# Patient Record
Sex: MALE | Race: WHITE | URBAN - METROPOLITAN AREA
[De-identification: names, ages, dates, MRNs, and addresses within clinical notes are randomized per-mention and may not be internally consistent; named-entity substitution may affect disease eponyms.]

---

## 2022-04-21 ENCOUNTER — OFFICE VISIT (OUTPATIENT)
Dept: FAMILY MEDICINE CLINIC | Age: 27
End: 2022-04-21
Payer: COMMERCIAL

## 2022-04-21 VITALS
DIASTOLIC BLOOD PRESSURE: 80 MMHG | HEIGHT: 66 IN | WEIGHT: 167 LBS | BODY MASS INDEX: 26.84 KG/M2 | SYSTOLIC BLOOD PRESSURE: 124 MMHG | TEMPERATURE: 97.2 F | HEART RATE: 69 BPM | OXYGEN SATURATION: 98 %

## 2022-04-21 DIAGNOSIS — R21 RASH AND NONSPECIFIC SKIN ERUPTION: Primary | ICD-10-CM

## 2022-04-21 DIAGNOSIS — Z78.9 MALE-TO-FEMALE TRANSGENDER PERSON: ICD-10-CM

## 2022-04-21 PROCEDURE — 99202 OFFICE O/P NEW SF 15 MIN: CPT | Performed by: FAMILY MEDICINE

## 2022-04-21 RX ORDER — SPIRONOLACTONE 100 MG/1
TABLET, FILM COATED ORAL
COMMUNITY
Start: 2022-03-17

## 2022-04-21 RX ORDER — ESTRADIOL 2 MG/1
TABLET ORAL
COMMUNITY
Start: 2022-03-17

## 2022-04-21 SDOH — ECONOMIC STABILITY: FOOD INSECURITY: WITHIN THE PAST 12 MONTHS, YOU WORRIED THAT YOUR FOOD WOULD RUN OUT BEFORE YOU GOT MONEY TO BUY MORE.: NEVER TRUE

## 2022-04-21 SDOH — ECONOMIC STABILITY: FOOD INSECURITY: WITHIN THE PAST 12 MONTHS, THE FOOD YOU BOUGHT JUST DIDN'T LAST AND YOU DIDN'T HAVE MONEY TO GET MORE.: NEVER TRUE

## 2022-04-21 ASSESSMENT — ENCOUNTER SYMPTOMS
RHINORRHEA: 0
COUGH: 0
ABDOMINAL PAIN: 0
SHORTNESS OF BREATH: 0
DIARRHEA: 0
CONSTIPATION: 0
SORE THROAT: 0
WHEEZING: 0

## 2022-04-21 ASSESSMENT — PATIENT HEALTH QUESTIONNAIRE - PHQ9
2. FEELING DOWN, DEPRESSED OR HOPELESS: 0
SUM OF ALL RESPONSES TO PHQ QUESTIONS 1-9: 0
1. LITTLE INTEREST OR PLEASURE IN DOING THINGS: 0
SUM OF ALL RESPONSES TO PHQ9 QUESTIONS 1 & 2: 0

## 2022-04-21 ASSESSMENT — SOCIAL DETERMINANTS OF HEALTH (SDOH): HOW HARD IS IT FOR YOU TO PAY FOR THE VERY BASICS LIKE FOOD, HOUSING, MEDICAL CARE, AND HEATING?: NOT HARD AT ALL

## 2022-04-21 NOTE — LETTER
[facility] Primary [facility]  [address] [address]  Phone: [phone]  Fax: [phone]    Delilah Hurst MD        April 21, 2022     Patient: Desean Espinoza   YOB: 1995   Date of Visit: 4/21/2022       To Whom It May Concern: Desean Espinoza is medically cleared to return to work without restrictions. If you have any questions or concerns, please don't hesitate to call.     Sincerely,        Dleilah Hurst MD

## 2022-04-21 NOTE — PROGRESS NOTES
[facility] [address] [place] [address] PRIMARY CARE  [address] [place] [id number]  Dept: [phone]  Dept Fax: [phone]: [phone]     Chief Complaint  Chief Complaint   Patient presents with   [facility] Establish Care     needs clearance to return to work,     Rash     was checked for bed bugs and none were found, rash is on right arm and B/L legs. HPI:  23 y.o.male who presents for the following:      Skin problem: 2-3 week ago found a single bed bug in his room; says he needs clearance to return to work; never found another bug; thinks this may have been the meds or detergents; the bumps can be itchy. Had an  come and didn't see any bugs. Works at a [employer] Review of Systems   Constitutional: Negative for chills and fever. HENT: Negative for congestion, rhinorrhea and sore throat. Respiratory: Negative for cough, shortness of breath and wheezing. Gastrointestinal: Negative for abdominal pain, constipation and diarrhea. Endocrine: Negative for polydipsia and polyuria. Genitourinary: Negative for dysuria, frequency and urgency. Skin: Positive for rash. Neurological: Negative for syncope, light-headedness, numbness and headaches. Psychiatric/Behavioral: Negative for sleep disturbance. The patient is not nervous/anxious. History reviewed. No pertinent past medical history.   Past Surgical History:   Procedure Laterality Date    TONSILLECTOMY       Social History     Socioeconomic History    Marital status: Single     Spouse name: Not on file    Number of children: Not on file    Years of education: Not on file    Highest education level: Not on file   Occupational History    Not on file   Tobacco Use    Smoking status: Never Smoker    Smokeless tobacco: Never Used   Substance and Sexual Activity    Alcohol use: Never    Drug use: Never    Sexual activity: Not on file Other Topics Concern    Not on file   Social History Narrative    Not on file     Social Determinants of Health     Financial Resource Strain: Low Risk     Difficulty of Paying Living Expenses: Not hard at all   Food Insecurity: No Food Insecurity    Worried About Running Out of Food in the Last Year: Never true    [address] in the Last Year: Never true   Transportation Needs:     Lack of Transportation (Medical): Not on file    Lack of Transportation (Non-Medical): Not on file   Physical Activity:     Days of Exercise per Week: Not on file    Minutes of Exercise per Session: Not on file   Stress:     Feeling of Stress : Not on file   Social Connections:     Frequency of Communication with Friends and Family: Not on file    Frequency of Social Gatherings with Friends and Family: Not on file    Attends Buddhist Services: Not on file    Active Member of [address] [place] [address] [employer] or Organizations: Not on file    Attends Club or Organization Meetings: Not on file    Marital Status: Not on file   Intimate Partner Violence:     Fear of Current or Ex-Partner: Not on file    Emotionally Abused: Not on file    Physically Abused: Not on file    Sexually Abused: Not on file   Housing Stability:     Unable to Pay for Housing in the Last Year: Not on file    Number of [place] in the Last Year: Not on file    Unstable Housing in the Last Year: Not on file     History reviewed. No pertinent family history. No Known Allergies  Current Outpatient Medications   Medication Sig Dispense Refill    estradiol (ESTRACE) 2 MG tablet TAKE TWO TABLETS BY MOUTH TWO TIMES A DAY      spironolactone (ALDACTONE) 100 MG tablet TAKE ONE TABLET BY MOUTH DAILY       No current facility-administered medications for this visit.          Vitals:    04/21/22 1345   BP: 124/80   Site: Right Upper Arm   Position: Sitting   Cuff Size: Medium Adult   Pulse: 69   Temp: 97.2 °F (36.2 °C)   TempSrc: Infrared   SpO2: 98%   Weight: 167 lb (75.8 kg)   Height: 5' 6\" (1.676 m)       Physical exam:  Physical Exam  Vitals reviewed. Constitutional:       General: He is not in acute distress. Appearance: He is well-developed. HENT:      Head: Normocephalic and atraumatic. Cardiovascular:      Rate and Rhythm: Normal rate. Pulmonary:      Effort: Pulmonary effort is normal. No respiratory distress. Musculoskeletal:      Cervical back: Normal range of motion. Skin:     General: Skin is warm and dry. Comments: Sparse scattered erythematous macules with excoriations over arms and legs   Neurological:      Mental Status: He is alert and oriented to person, place, and time. Psychiatric:         Behavior: Behavior normal.         Assessment/Plan:  23 y.o. male here mainly for rash:  - nonspecific rash. We talked about bed bugs; there's no med to provide. Already cleaned the home and had  visit. She can return to work  - Currently going to [facility] in [place] and transitioning Male to Female; taking estrodiol and spironolactone     Diagnosis Orders   1. Rash and nonspecific skin eruption     2. Male-to-female transgender person          Return if symptoms worsen or fail to improve.     Augustine Willoughby MD

## 2022-04-28 ENCOUNTER — TELEPHONE (OUTPATIENT)
Dept: FAMILY MEDICINE CLINIC | Age: 27
End: 2022-04-28

## 2022-04-28 NOTE — TELEPHONE ENCOUNTER
Spoke with the patient and he stated the FMLA is needed because he was out of work from 4/11-4/25/2022 due to bed bugs. He is going back to work on 4/28/22 at 4 pm.    If pt does not answer the phone it is ok to LM on his VM.

## 2022-04-28 NOTE — TELEPHONE ENCOUNTER
Patient dropped of FMLA forms. Are these okay to fill out? I don't see anything in the prior visit notes.

## 2024-10-19 ENCOUNTER — APPOINTMENT (OUTPATIENT)
Dept: RADIOLOGY | Facility: HOSPITAL | Age: 29
End: 2024-10-19

## 2024-10-19 ENCOUNTER — HOSPITAL ENCOUNTER (EMERGENCY)
Facility: HOSPITAL | Age: 29
Discharge: HOME | End: 2024-10-19
Attending: EMERGENCY MEDICINE

## 2024-10-19 ENCOUNTER — APPOINTMENT (OUTPATIENT)
Dept: CARDIOLOGY | Facility: HOSPITAL | Age: 29
End: 2024-10-19

## 2024-10-19 VITALS
SYSTOLIC BLOOD PRESSURE: 136 MMHG | RESPIRATION RATE: 18 BRPM | DIASTOLIC BLOOD PRESSURE: 60 MMHG | WEIGHT: 180 LBS | HEIGHT: 66 IN | OXYGEN SATURATION: 98 % | BODY MASS INDEX: 28.93 KG/M2 | TEMPERATURE: 98.1 F | HEART RATE: 84 BPM

## 2024-10-19 DIAGNOSIS — K21.9 GASTROESOPHAGEAL REFLUX DISEASE, UNSPECIFIED WHETHER ESOPHAGITIS PRESENT: ICD-10-CM

## 2024-10-19 DIAGNOSIS — R07.9 CHEST PAIN, UNSPECIFIED TYPE: Primary | ICD-10-CM

## 2024-10-19 PROCEDURE — 71046 X-RAY EXAM CHEST 2 VIEWS: CPT | Mod: FOREIGN READ | Performed by: RADIOLOGY

## 2024-10-19 PROCEDURE — 71046 X-RAY EXAM CHEST 2 VIEWS: CPT

## 2024-10-19 PROCEDURE — 99283 EMERGENCY DEPT VISIT LOW MDM: CPT | Mod: 25

## 2024-10-19 PROCEDURE — 2500000005 HC RX 250 GENERAL PHARMACY W/O HCPCS

## 2024-10-19 PROCEDURE — 93005 ELECTROCARDIOGRAM TRACING: CPT

## 2024-10-19 PROCEDURE — 2500000002 HC RX 250 W HCPCS SELF ADMINISTERED DRUGS (ALT 637 FOR MEDICARE OP, ALT 636 FOR OP/ED)

## 2024-10-19 RX ORDER — PANTOPRAZOLE SODIUM 20 MG/1
20 TABLET, DELAYED RELEASE ORAL ONCE
Status: COMPLETED | OUTPATIENT
Start: 2024-10-19 | End: 2024-10-19

## 2024-10-19 RX ORDER — PANTOPRAZOLE SODIUM 20 MG/1
20 TABLET, DELAYED RELEASE ORAL DAILY
Qty: 20 TABLET | Refills: 0 | Status: SHIPPED | OUTPATIENT
Start: 2024-10-19 | End: 2024-11-08

## 2024-10-19 RX ADMIN — PANTOPRAZOLE SODIUM 20 MG: 20 TABLET, DELAYED RELEASE ORAL at 19:27

## 2024-10-19 RX ADMIN — ALUMINUM HYDROXIDE, MAGNESIUM HYDROXIDE, AND SIMETHICONE 10 ML: 1200; 120; 1200 SUSPENSION ORAL at 19:16

## 2024-10-19 ASSESSMENT — PAIN SCALES - GENERAL: PAINLEVEL_OUTOF10: 4

## 2024-10-19 ASSESSMENT — COLUMBIA-SUICIDE SEVERITY RATING SCALE - C-SSRS
2. HAVE YOU ACTUALLY HAD ANY THOUGHTS OF KILLING YOURSELF?: NO
1. IN THE PAST MONTH, HAVE YOU WISHED YOU WERE DEAD OR WISHED YOU COULD GO TO SLEEP AND NOT WAKE UP?: NO
6. HAVE YOU EVER DONE ANYTHING, STARTED TO DO ANYTHING, OR PREPARED TO DO ANYTHING TO END YOUR LIFE?: NO

## 2024-10-19 ASSESSMENT — PAIN - FUNCTIONAL ASSESSMENT: PAIN_FUNCTIONAL_ASSESSMENT: 0-10

## 2024-10-19 NOTE — DISCHARGE INSTRUCTIONS
You were seen today due to concerns of GERD.  Please take pantoprazole as directed and follow-up with both gastroenterology and her primary physician.  Try to avoid fatty for foods and return if you have any worsening symptoms.  Please take pantoprazole prior to meals by about 20 minutes.

## 2024-10-19 NOTE — ED PROVIDER NOTES
CC: Chest Pain (CP with exertion for the past 2 days. Pt denies any radiation of pain. Pt describes as burning in the middle of the chest )     HPI: Patient is a 29-year-old transgender female (she/they pronouns) presenting due to epigastric pain with a burning sensation for the past 3 days.  Patient endorses retrosternal chest pain that is worse in the morning and reports a sour taste when she wakes up in the morning.  Patient denies any recent fevers, chills and endorses a poor diet recently due to financial constraints.  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  Endorses eating pizza prior to initiation of symptoms and denies any nausea or vomiting after eating.  She does endorse slight amount of nausea in between meals without any clear abdominal pain noted.  Patient has not had abdominal surgery, has normal bowel movements and a very nontender abdominal exam.  Lungs are clear to auscultation bilaterally.  Patient denies any direct family history of heart attack before the age of 45 and drinks very rarely.  Patient does smoke marijuana but has not smoked recently.    Limitations to History: none  Additional History Obtained from: friend    PMHx/PSHx:  Per HPI.   - has no past medical history on file.  - has no past surgical history on file.    Social History:  - Tobacco:  has no history on file for tobacco use.   - Alcohol:  has no history on file for alcohol use.   - Drugs:  has no history on file for drug use.     Medications: Reviewed in EMR.     Allergies:  Patient has no known allergies.    ???????????????????????????????????????????????????????????????  Triage Vitals:  T 36.7 °C (98.1 °F)  HR 84  /60  RR 18  O2 98 % None (Room air)    Physical Exam  Vitals and nursing note reviewed.   Constitutional:       General: He is not in acute distress.     Appearance: He is well-developed.   HENT:      Head: Normocephalic and atraumatic.   Eyes:      Conjunctiva/sclera:  Conjunctivae normal.   Cardiovascular:      Rate and Rhythm: Normal rate and regular rhythm.      Heart sounds: No murmur heard.  Pulmonary:      Effort: Pulmonary effort is normal. No respiratory distress.      Breath sounds: Normal breath sounds.   Abdominal:      Palpations: Abdomen is soft.      Tenderness: There is no abdominal tenderness.   Musculoskeletal:         General: No swelling.      Cervical back: Neck supple.   Skin:     General: Skin is warm and dry.      Capillary Refill: Capillary refill takes less than 2 seconds.   Neurological:      Mental Status: He is alert.   Psychiatric:         Mood and Affect: Mood normal.       ???????????????????????????????????????????????????????????????  EKG (per my interpretation): Sinus rhythm with a rate of 86.  No NJ QRS punctation.  No acute ST or T wave changes noted.  No GRETA.  QTc 418    ED Course  Diagnoses as of 10/19/24 1907   Chest pain, unspecified type   Gastroesophageal reflux disease, unspecified whether esophagitis present       Medical Decision Making:  Patient is a 29-year-old transgender female who presented due to concerns of chest pain for the past 3 days.  Differentials considered but not limited to GERD, ACS, pneumothorax, pneumonia, flu, COVID, pancreatitis, cholecystitis.    Based on patient's history and physical exam, overall reassuring abdominal exam and patient is tolerating oral intake without any acute weight loss.  My concern at this time for any acute gallbladder or pancreatic pathology is low.  Patient's chest x-ray is reassuring and EKG is nonischemic.  Patient was given BMX and pantoprazole in the emergency department and given a prescription for pantoprazole.  Patient's symptomatology seems very consistent with GERD. I have low concern for ACS and patient does not require lab work at this time.  Chest x-ray does not show any signs of pneumonia or pneumothorax and patient does not have any signs or symptoms of flu or COVID.  Patient  was given gastroenterology follow-up and discharged in hemodynamically stable condition.  Patient care was overseen by attending physician agrees with the plan of disposition.    External records reviewed: recent inpatient, clinic, and prior ED notes  Diagnostic imaging independently reviewed/interpreted by me (as reflected in MDM) includes: CXR  Social Determinants Affecting Care: None identified  Discussion of management with other providers: attending  Prescription Drug Consideration: pantoprazole  Escalation of Care: none    Impression:   Chest Pain  GERD    Disposition: Discharge      Procedures ? SmartLinks last updated 10/19/2024 6:59 PM        Laura Valadez MD  Resident  10/19/24 7252

## 2024-10-22 LAB
ATRIAL RATE: 83 BPM
P AXIS: 72 DEGREES
PR INTERVAL: 134 MS
Q ONSET: 252 MS
QRS COUNT: 14 BEATS
QRS DURATION: 98 MS
QT INTERVAL: 349 MS
QTC CALCULATION(BAZETT): 418 MS
QTC FREDERICIA: 393 MS
R AXIS: 55 DEGREES
T AXIS: 44 DEGREES
T OFFSET: 426 MS
VENTRICULAR RATE: 86 BPM
